# Patient Record
Sex: FEMALE | Race: WHITE | HISPANIC OR LATINO | Employment: OTHER | URBAN - METROPOLITAN AREA
[De-identification: names, ages, dates, MRNs, and addresses within clinical notes are randomized per-mention and may not be internally consistent; named-entity substitution may affect disease eponyms.]

---

## 2017-07-11 ENCOUNTER — HOSPITAL ENCOUNTER (EMERGENCY)
Facility: HOSPITAL | Age: 57
Discharge: HOME | End: 2017-07-11
Admitting: EMERGENCY MEDICINE
Payer: COMMERCIAL

## 2017-07-11 ENCOUNTER — APPOINTMENT (EMERGENCY)
Dept: CT IMAGING | Facility: HOSPITAL | Age: 57
End: 2017-07-11
Payer: COMMERCIAL

## 2017-07-11 VITALS
DIASTOLIC BLOOD PRESSURE: 72 MMHG | SYSTOLIC BLOOD PRESSURE: 158 MMHG | HEART RATE: 95 BPM | TEMPERATURE: 99.9 F | RESPIRATION RATE: 20 BRPM | WEIGHT: 250 LBS | BODY MASS INDEX: 41.65 KG/M2 | HEIGHT: 65 IN | OXYGEN SATURATION: 95 %

## 2017-07-11 DIAGNOSIS — S09.90XA HEAD INJURY, INITIAL ENCOUNTER: Primary | ICD-10-CM

## 2017-07-11 PROCEDURE — 70450 CT HEAD/BRAIN W/O DYE: CPT

## 2017-07-11 PROCEDURE — 99284 EMERGENCY DEPT VISIT MOD MDM: CPT

## 2017-07-11 RX ORDER — BUPROPION HYDROCHLORIDE 150 MG/1
150 TABLET ORAL DAILY
COMMUNITY
End: 2021-07-13

## 2017-07-11 RX ORDER — ASPIRIN 81 MG/1
81 TABLET ORAL 2 TIMES DAILY
COMMUNITY
End: 2021-07-13

## 2017-07-11 RX ORDER — SIMVASTATIN 10 MG
10 TABLET ORAL
COMMUNITY
End: 2021-07-13

## 2020-11-27 ENCOUNTER — APPOINTMENT (OUTPATIENT)
Dept: RADIOLOGY | Facility: MEDICAL CENTER | Age: 60
End: 2020-11-27
Payer: COMMERCIAL

## 2020-11-27 ENCOUNTER — OFFICE VISIT (OUTPATIENT)
Dept: OBGYN CLINIC | Facility: MEDICAL CENTER | Age: 60
End: 2020-11-27
Payer: COMMERCIAL

## 2020-11-27 VITALS
WEIGHT: 273 LBS | HEART RATE: 93 BPM | TEMPERATURE: 97.3 F | DIASTOLIC BLOOD PRESSURE: 83 MMHG | BODY MASS INDEX: 45.48 KG/M2 | SYSTOLIC BLOOD PRESSURE: 130 MMHG | HEIGHT: 65 IN

## 2020-11-27 DIAGNOSIS — M16.11 PRIMARY OSTEOARTHRITIS OF ONE HIP, RIGHT: Primary | ICD-10-CM

## 2020-11-27 DIAGNOSIS — M25.551 PAIN IN RIGHT HIP: ICD-10-CM

## 2020-11-27 PROCEDURE — 73502 X-RAY EXAM HIP UNI 2-3 VIEWS: CPT

## 2020-11-27 PROCEDURE — 99243 OFF/OP CNSLTJ NEW/EST LOW 30: CPT | Performed by: ORTHOPAEDIC SURGERY

## 2020-12-22 ENCOUNTER — CONSULT (OUTPATIENT)
Dept: PAIN MEDICINE | Facility: CLINIC | Age: 60
End: 2020-12-22
Payer: COMMERCIAL

## 2020-12-22 ENCOUNTER — APPOINTMENT (OUTPATIENT)
Dept: RADIOLOGY | Facility: CLINIC | Age: 60
End: 2020-12-22
Payer: COMMERCIAL

## 2020-12-22 VITALS
BODY MASS INDEX: 45.15 KG/M2 | TEMPERATURE: 97.9 F | HEART RATE: 83 BPM | SYSTOLIC BLOOD PRESSURE: 138 MMHG | HEIGHT: 65 IN | WEIGHT: 271 LBS | DIASTOLIC BLOOD PRESSURE: 78 MMHG

## 2020-12-22 DIAGNOSIS — M16.11 PRIMARY OSTEOARTHRITIS OF ONE HIP, RIGHT: ICD-10-CM

## 2020-12-22 DIAGNOSIS — M25.551 PAIN IN RIGHT HIP: ICD-10-CM

## 2020-12-22 DIAGNOSIS — G89.4 CHRONIC PAIN SYNDROME: ICD-10-CM

## 2020-12-22 DIAGNOSIS — M54.16 LUMBAR RADICULOPATHY: ICD-10-CM

## 2020-12-22 DIAGNOSIS — M79.18 MYOFASCIAL PAIN SYNDROME: ICD-10-CM

## 2020-12-22 DIAGNOSIS — M54.16 LUMBAR RADICULOPATHY: Primary | ICD-10-CM

## 2020-12-22 PROBLEM — M43.02 CERVICAL SPONDYLOLYSIS: Status: ACTIVE | Noted: 2020-08-31

## 2020-12-22 PROBLEM — G56.01 CARPAL TUNNEL SYNDROME OF RIGHT WRIST: Status: ACTIVE | Noted: 2020-08-31

## 2020-12-22 PROBLEM — M54.12 CERVICAL RADICULOPATHY: Status: ACTIVE | Noted: 2020-10-27

## 2020-12-22 PROCEDURE — 72110 X-RAY EXAM L-2 SPINE 4/>VWS: CPT

## 2020-12-22 PROCEDURE — 99244 OFF/OP CNSLTJ NEW/EST MOD 40: CPT | Performed by: ANESTHESIOLOGY

## 2020-12-22 RX ORDER — ATORVASTATIN CALCIUM 10 MG/1
10 TABLET, FILM COATED ORAL DAILY
COMMUNITY
End: 2021-07-13

## 2020-12-22 RX ORDER — ALBUTEROL SULFATE 90 UG/1
2 AEROSOL, METERED RESPIRATORY (INHALATION) EVERY 6 HOURS PRN
COMMUNITY

## 2020-12-28 ENCOUNTER — TELEPHONE (OUTPATIENT)
Dept: PAIN MEDICINE | Facility: CLINIC | Age: 60
End: 2020-12-28

## 2021-01-08 ENCOUNTER — HOSPITAL ENCOUNTER (OUTPATIENT)
Dept: RADIOLOGY | Facility: CLINIC | Age: 61
Discharge: HOME | End: 2021-01-08
Attending: ANESTHESIOLOGY
Payer: COMMERCIAL

## 2021-01-08 ENCOUNTER — HOSPITAL ENCOUNTER (OUTPATIENT)
Dept: MRI IMAGING | Facility: HOSPITAL | Age: 61
Discharge: HOME | End: 2021-01-08
Attending: ANESTHESIOLOGY
Payer: COMMERCIAL

## 2021-01-08 VITALS
OXYGEN SATURATION: 91 % | SYSTOLIC BLOOD PRESSURE: 140 MMHG | HEART RATE: 103 BPM | TEMPERATURE: 97.3 F | RESPIRATION RATE: 22 BRPM | DIASTOLIC BLOOD PRESSURE: 84 MMHG

## 2021-01-08 DIAGNOSIS — M54.16 LUMBAR RADICULOPATHY: ICD-10-CM

## 2021-01-08 DIAGNOSIS — M16.11 PRIMARY OSTEOARTHRITIS OF ONE HIP, RIGHT: ICD-10-CM

## 2021-01-08 PROCEDURE — 77002 NEEDLE LOCALIZATION BY XRAY: CPT | Performed by: ANESTHESIOLOGY

## 2021-01-08 PROCEDURE — 77002 NEEDLE LOCALIZATION BY XRAY: CPT

## 2021-01-08 PROCEDURE — 20610 DRAIN/INJ JOINT/BURSA W/O US: CPT | Performed by: ANESTHESIOLOGY

## 2021-01-08 PROCEDURE — 72148 MRI LUMBAR SPINE W/O DYE: CPT

## 2021-01-08 PROCEDURE — G1004 CDSM NDSC: HCPCS

## 2021-01-08 RX ORDER — LIDOCAINE HYDROCHLORIDE 10 MG/ML
5 INJECTION, SOLUTION EPIDURAL; INFILTRATION; INTRACAUDAL; PERINEURAL ONCE
Status: COMPLETED | OUTPATIENT
Start: 2021-01-08 | End: 2021-01-08

## 2021-01-08 RX ORDER — METHYLPREDNISOLONE ACETATE 80 MG/ML
80 INJECTION, SUSPENSION INTRA-ARTICULAR; INTRALESIONAL; INTRAMUSCULAR; PARENTERAL; SOFT TISSUE ONCE
Status: COMPLETED | OUTPATIENT
Start: 2021-01-08 | End: 2021-01-08

## 2021-01-08 RX ADMIN — LIDOCAINE HYDROCHLORIDE 2 ML: 20 INJECTION, SOLUTION EPIDURAL; INFILTRATION; INTRACAUDAL at 11:41

## 2021-01-08 RX ADMIN — LIDOCAINE HYDROCHLORIDE 3 ML: 10 INJECTION, SOLUTION EPIDURAL; INFILTRATION; INTRACAUDAL; PERINEURAL at 11:38

## 2021-01-08 RX ADMIN — METHYLPREDNISOLONE ACETATE 80 MG: 80 INJECTION, SUSPENSION INTRA-ARTICULAR; INTRALESIONAL; INTRAMUSCULAR; SOFT TISSUE at 11:42

## 2021-01-08 RX ADMIN — IOHEXOL 1 ML: 300 INJECTION, SOLUTION INTRAVENOUS at 11:40

## 2021-01-08 NOTE — H&P
History of Present Illness: The patient is a 63 y o  female who presents with complaints of right hip pain      Patient Active Problem List   Diagnosis    Myofascial pain syndrome    Chronic pain syndrome    Primary osteoarthritis of one hip, right    Acute bronchitis    Carpal tunnel syndrome of right wrist    Cervical radiculopathy    Cervical spondylolysis    [facility]A [facility]re[facility] accident) [facility] [address] [facility]    Encounter for medical clearance for patient hold    Earache, left    History of recent stroke    Mild intermittent asthma without complication    Morbid obesity ([facility] [address] )    Obstructive sleep apnea syndrome       Past Medical History:   Diagnosis Date    Anxiety     Arthritis     Asthma     Chest pain     Depression     Fatty liver     GERD (gastroesophageal reflux disease)     High cholesterol     Hyperlipidemia     Kidney stones     Myofascial pain     Psychiatric disorder     Stroke [facility])        Past Surgical History:   Procedure Laterality Date    TOTAL HIP ARTHROPLASTY           Current Outpatient Medications:     albuterol (PROVENTIL HFA,VENTOLIN HFA) 90 mcg/act inhaler, Inhale 2 puffs every 6 (six) hours as needed for wheezing, Disp: , Rfl:     aspirin (ECOTRIN LOW STRENGTH) 81 mg EC tablet, Take 81 mg by mouth 2 (two) times a day, Disp: , Rfl:     atorvastatin (LIPITOR) 10 mg tablet, Take 10 mg by mouth daily, Disp: , Rfl:     buPROPion (WELLBUTRIN XL) 150 mg 24 hr tablet, Take 150 mg by mouth daily, Disp: , Rfl:     Homeopathic Products (ARNICA EX), Apply topically, Disp: , Rfl:     simvastatin (ZOCOR) 10 mg tablet, Take 10 mg by mouth daily at bedtime, Disp: , Rfl:     TURMERIC PO, Take by mouth, Disp: , Rfl:     Current Facility-Administered Medications:     iohexol (OMNIPAQUE) 300 mg/mL injection 50 mL, 50 mL, Intra-articular, Once, Edmundo Alvarez DO    lidocaine (PF) (XYLOCAINE-MPF) 1 % injection 5 mL, 5 mL, Other, Once, Edmundo Alvarez DO    lidocaine (PF) (XYLOCAINE-MPF) 2 % injection 5 mL, 5 mL, Intra-articular, Once, Edmundo Alvarez DO    methylPREDNISolone acetate (DEPO-MEDROL) injection 80 mg, 80 mg, Intra-articular, Once, Edmundo Alvarez DO    Allergies   Allergen Reactions    Fluticasone-Salmeterol      Other reaction(s): Other (See Comments)  Difficulty breathing / mouth sores    Penicillins      Other reaction(s): Nausea and/or vomiting    Salmeterol      Other reaction(s): Other (See Comments)  "Difficulty breathing / sores in mouth"       Physical Exam:   General: Awake, Alert, Oriented x 3, Mood and affect appropriate  Respiratory: Respirations even and unlabored  Cardiovascular: Peripheral pulses intact; no edema  Musculoskeletal Exam: decreased range of motion right    ASA Score: III       Assessment:   1   Primary osteoarthritis of one hip, right        Plan: right hip joint injection

## 2021-01-08 NOTE — DISCHARGE INSTRUCTIONS
Steroid Joint Injection   WHAT YOU NEED TO KNOW:   A steroid joint injection is a procedure to inject steroid medicine into a joint  Steroid medicine decreases pain and inflammation  The injection may also contain an anesthetic (numbing medicine) to decrease pain  It may be done to treat conditions such as arthritis, gout, or carpal tunnel syndrome  The injections may be given in your knee, ankle, shoulder, elbow, wrist, ankle or sacroiliac joint  1  Do not apply heat to any area that is numb  If you have discomfort or soreness at the injection site, you may apply ice today, 20 minutes on and 20 minutes off  Tomorrow you may use ice or warm, moist heat  Do not apply ice or heat directly to the skin  2  You may have an increase or change in the discomfort for 36-48 hours after your treatment  Apply ice and continue with any pain medicine you have been prescribed  3  Do not do anything strenuous today  You may shower, but no tub baths or hot tubs today  You may resume your normal activities tomorrow, but do not overdo it  Resume normal activities slowly when you are feeling better  4  If you experience redness, drainage or swelling at the injection site, or if you develop a fever above 100 degrees, please call [facility] at [phone] or go to the Emergency Room  5  Continue to take all routine medicines prescribed by your primary care physician unless otherwise instructed by our staff  Most blood thinners should be started again according to your regularly scheduled dosing  If you have any questions, please give our office a call  If you have a problem specifically related to your procedure, please call our office at [phone]  Problems not related to your procedure should be directed to your primary care physician

## 2021-01-12 ENCOUNTER — TELEPHONE (OUTPATIENT)
Dept: PAIN MEDICINE | Facility: MEDICAL CENTER | Age: 61
End: 2021-01-12

## 2021-01-12 NOTE — TELEPHONE ENCOUNTER
----- Message from Roseanne Damico DO sent at 1/11/2021 11:34 AM EST -----  MRI lumbar: Subacute L5 compression fracture with moderate loss of height, stable since 12/22/2020      Multilevel degenerative changes of the lumbar spine, as described above  Narrowing of the lateral recesses is noted at L4-L5 with left greater than right contact of the descending L5 nerve roots  Is her pain greater on the right?   Would consider right L5 TFESI X 2

## 2021-01-13 NOTE — TELEPHONE ENCOUNTER
S/w pt, advised of above  Pt stated that her pain is worst on the R  Pt added that she has myofascial pain syndrome and advised that there was difficulty during her facet joint injection in the past s/t the tightness in her muscles  Pt stated that she will need to sit down and think about this info before making a decision  Advised pt to cb with any questions she may have  Pt verbalized understanding and appreciation       SISSY

## 2021-01-15 ENCOUNTER — TELEPHONE (OUTPATIENT)
Dept: PAIN MEDICINE | Facility: CLINIC | Age: 61
End: 2021-01-15

## 2021-03-05 ENCOUNTER — TELEPHONE (OUTPATIENT)
Dept: RADIOLOGY | Facility: CLINIC | Age: 61
End: 2021-03-05

## 2021-03-05 ENCOUNTER — TELEPHONE (OUTPATIENT)
Dept: PAIN MEDICINE | Facility: CLINIC | Age: 61
End: 2021-03-05

## 2021-03-05 NOTE — TELEPHONE ENCOUNTER
PT HAS SOME QUESTIONS ABOUT MRI  AND PAIN IN KNEE AND LOWER BACK AND MID BACK AND WOULD LIKE TO SCHEDULE A PROCEDURE

## 2021-03-10 DIAGNOSIS — Z23 ENCOUNTER FOR IMMUNIZATION: ICD-10-CM

## 2021-03-18 ENCOUNTER — TELEMEDICINE (OUTPATIENT)
Dept: PAIN MEDICINE | Facility: CLINIC | Age: 61
End: 2021-03-18
Payer: COMMERCIAL

## 2021-03-18 ENCOUNTER — TELEPHONE (OUTPATIENT)
Dept: RADIOLOGY | Facility: CLINIC | Age: 61
End: 2021-03-18

## 2021-03-18 DIAGNOSIS — G89.4 CHRONIC PAIN SYNDROME: Primary | ICD-10-CM

## 2021-03-18 DIAGNOSIS — M54.16 LUMBAR RADICULOPATHY: ICD-10-CM

## 2021-03-18 PROCEDURE — 99214 OFFICE O/P EST MOD 30 MIN: CPT | Performed by: NURSE PRACTITIONER

## 2021-03-18 NOTE — PROGRESS NOTES
Virtual Brief Visit    Assessment/Plan:     at this time we will get the patient scheduled for a right L5 transforaminal epidural steroid injection to decrease any inflammatory component of her pain symptoms  This may need to be repeated  Patient tells me that she is on her last day of antibiotics for a urinary tract infection  Follow up after procedure    Problem List Items Addressed This Visit        Nervous and Auditory    Lumbar radiculopathy    Relevant Orders    FL spine and pain procedure       Other    Chronic pain syndrome - Primary                Reason for visit is  Follow-up  Chief Complaint   Patient presents with    Virtual Brief Visit        Encounter provider Jen Elizabeth [address]     Provider located at [address]  [address]  [facility] [address]  [phone]    Recent Visits  No visits were found meeting these conditions  Showing recent visits within past 7 days and meeting all other requirements     Today's Visits  Date Type Provider Dept   03/18/21 [address] [place], [place] [address]   Showing today's visits and meeting all other requirements     Future Appointments  No visits were found meeting these conditions  Showing future appointments within next 150 days and meeting all other requirements        After connecting through telephone, the patient was identified by name and date of birth  Guadalupe Jones was informed that this is a telemedicine visit and that the visit is being conducted through telephone  My office door was closed  No one else was in the room  She acknowledged consent and understanding of privacy and security of the platform  The patient has agreed to participate and understands she can discontinue the visit at any time  Patient is aware this is a billable service       Subjective    Guadalupe Jones is a 63 y o  female  Presents for follow-up related to her right low back and leg pain   Today she rates the pain 8/10 she localizes her pain across her low back and down her right leg  Patient is also having some pain in her left knee but this is being managed by her PCP and will be referred to orthopedics for further evaluation she tells me  Patient is status post a right intra-articular hip injection on January 8, 2021  She tells me that she did get the level of relief she was hoping but it definitely has improved her symptoms  Patient tells me that she did get a phone call to review her lumbar spine MRI and she was offered a right L5 transforaminal epidural steroid injection  She told me that she want to take some time to think about it but she is rated move forward as her pain symptoms are worsening  Suzette ARAGON     Past Medical History:   Diagnosis Date    Anxiety     Arthritis     Asthma     Chest pain     Depression     Fatty liver     GERD (gastroesophageal reflux disease)     High cholesterol     Hyperlipidemia     Kidney stones     Myofascial pain     Psychiatric disorder     Stroke [facility])        Past Surgical History:   Procedure Laterality Date    TOTAL HIP ARTHROPLASTY         Current Outpatient Medications   Medication Sig Dispense Refill    albuterol (PROVENTIL HFA,VENTOLIN HFA) 90 mcg/act inhaler Inhale 2 puffs every 6 (six) hours as needed for wheezing      aspirin (ECOTRIN LOW STRENGTH) 81 mg EC tablet Take 81 mg by mouth 2 (two) times a day      atorvastatin (LIPITOR) 10 mg tablet Take 10 mg by mouth daily      buPROPion (WELLBUTRIN XL) 150 mg 24 hr tablet Take 150 mg by mouth daily      Homeopathic Products (ARNICA EX) Apply topically      simvastatin (ZOCOR) 10 mg tablet Take 10 mg by mouth daily at bedtime      TURMERIC PO Take by mouth       No current facility-administered medications for this visit  Allergies   Allergen Reactions    Fluticasone-Salmeterol      Other reaction(s):  Other (See Comments)  Difficulty breathing / mouth sores  Penicillins      Other reaction(s): Nausea and/or vomiting    Salmeterol      Other reaction(s): Other (See Comments)  "Difficulty breathing / sores in mouth"       Review of Systems    There were no vitals filed for this visit  I spent 8 minutes directly with the patient during this visit  It was my intent to perform this visit via video technology but the patient was not able to do a video connection so the visit was completed via audio telephone only  VIRTUAL VISIT DISCLAIMER    Zaki Huntley acknowledges that she has consented to an online visit or consultation  She understands that the online visit is based solely on information provided by her, and that, in the absence of a face-to-face physical evaluation by the physician, the diagnosis she receives is both limited and provisional in terms of accuracy and completeness  This is not intended to replace a full medical face-to-face evaluation by the physician  Zaki Huntley understands and accepts these terms

## 2021-03-18 NOTE — TELEPHONE ENCOUNTER
Called to schedule pt and she states that she just got the covid vaccine yesterday 3/17/21 per our guide lines we have to wait 2 weeks till a procedure and then she told me that she would be getting her second one on 4/6 I advised that I could not schedule her till 4/22 due to timing with the vaccine and that is when she became very upset  Pt states that she is in a lot of pain and that she needs something and that she can't believe that she has to wait another month to get her back injection  I apologized to her and said that this is the protocol  She states that she was already told about Voltaren gel and that she is already using Biofreeze  Pt would like to know if there is anything else she can do to help with the pain? Pt states that she does not want anything that is addictive that she does not need that she has so many other things going on

## 2021-03-23 NOTE — TELEPHONE ENCOUNTER
Attempted to reach pt  Left detailed message, per LIDIA, advising of AO's notation, [place] and phone # provided for c/b to discuss further

## 2021-03-23 NOTE — TELEPHONE ENCOUNTER
Patient called returning the nurses call  Please be advise thank you        Please call patient back @ [phone] can be reached @  12:30 pm

## 2021-03-25 ENCOUNTER — APPOINTMENT (OUTPATIENT)
Dept: RADIOLOGY | Facility: MEDICAL CENTER | Age: 61
End: 2021-03-25
Payer: COMMERCIAL

## 2021-03-25 ENCOUNTER — OFFICE VISIT (OUTPATIENT)
Dept: OBGYN CLINIC | Facility: MEDICAL CENTER | Age: 61
End: 2021-03-25
Payer: COMMERCIAL

## 2021-03-25 VITALS
BODY MASS INDEX: 46.61 KG/M2 | DIASTOLIC BLOOD PRESSURE: 87 MMHG | WEIGHT: 273 LBS | HEIGHT: 64 IN | HEART RATE: 89 BPM | SYSTOLIC BLOOD PRESSURE: 140 MMHG | TEMPERATURE: 98.4 F

## 2021-03-25 DIAGNOSIS — Z01.89 ENCOUNTER FOR LOWER EXTREMITY COMPARISON IMAGING STUDY: ICD-10-CM

## 2021-03-25 DIAGNOSIS — M17.12 PATELLOFEMORAL ARTHRITIS OF LEFT KNEE: ICD-10-CM

## 2021-03-25 DIAGNOSIS — M25.562 LEFT KNEE PAIN, UNSPECIFIED CHRONICITY: ICD-10-CM

## 2021-03-25 DIAGNOSIS — R63.4 WEIGHT LOSS: ICD-10-CM

## 2021-03-25 DIAGNOSIS — M17.12 PRIMARY OSTEOARTHRITIS OF LEFT KNEE: Primary | ICD-10-CM

## 2021-03-25 PROCEDURE — 73564 X-RAY EXAM KNEE 4 OR MORE: CPT

## 2021-03-25 PROCEDURE — 73560 X-RAY EXAM OF KNEE 1 OR 2: CPT

## 2021-03-25 PROCEDURE — 99214 OFFICE O/P EST MOD 30 MIN: CPT | Performed by: ORTHOPAEDIC SURGERY

## 2021-03-25 NOTE — PROGRESS NOTES
Assessment/Plan     1  Primary osteoarthritis of left knee    2  Left knee pain, unspecified chronicity    3  Encounter for lower extremity comparison imaging study    4  Patellofemoral arthritis of left knee    5  Weight loss      Orders Placed This Encounter   Procedures    XR knee 4+ vw left injury    XR knee 1 or 2 vw right    Ambulatory referral to Nutrition Services    Steroid Injection     · Patient had mild left knee and moderate patellofemoral osteoarthritis, MCL strain and possible meniscal tear  · Discussed with patient conservative treatments: steroid injections, physical therapy , weight loss and medications  · Will hold off on steroid injection due to being scheduled for her 2nd COVID vaccine on 4/6/21  · An order was given out for [facility] for left knee steroid injection  Patient is scheduled for lumbar maria esther and would like to have left knee injection at the same time   · Declined physical therapy script: Provided patient with home stretching and strengthening exercises for the left knee  Advised patient to do the exercises for 4-6 daily and if no improvement, to call and make an appointment   · Continue taking Tylenol a 1000 mg every 8 hours as needed for pain  Do not exceed 3000 mg a day  She cannot take NSAIDs due to history of having a stroke  · Continue wearing knee brace as needed for comfort     Return if symptoms worsen or fail to improve  I answered all of the patient's questions during the visit and provided education of the patient's condition during the visit  The patient verbalized understanding of the information given and agrees with the plan  This note was dictated using [employer] software  It may contain errors including improperly dictated words  Please contact physician directly for any questions  History of Present Illness   Chief complaint:   Chief Complaint   Patient presents with    Left Knee - Pain       HPI: Layla Walden is a 63 y o  female that c/o left knee pain  She was referred by her PCP Dr Kike Valverde  She states she had a partial fall down the steps in Jan 2021 and twisted her left knee  She also notes in Feb 2021 she was shoveling from pivoting and lifting aggravated  her left knee  She is having dull achy pain that comes and goes over anteromedial left knee  She denies any mechanical symptoms  Pain is worse going up and down steps, pivoting, turning, getting in and out of a car, and at night when turning in bed  She has been taking Tylenol Arthritis and Tumeric beans with some relief  She stopping using the  Voltaren gel  due to side effects she read( h of stroke)     She has been wearing an over-the-counter knee brace with some relief  Patient cannot take NSAIDs due to history of having a stroke  She has no history of having any injections, physical therapy or surgeries on the left knee  She did have her first [place] vaccination on March seventeenth and is due for her second injection on 04/06/2021  She atient also has severe right hip osteoarthritis  She did have a right hip steroid injection with Dr Priti Alanis on 01/08/2021and had  10-30% relief from the injection  ROS:    See HPI for musculoskeletal review  All other systems reviewed are negative     Historical Information   Past Medical History:   Diagnosis Date    Anxiety     Arthritis     Asthma     Chest pain     Depression     Fatty liver     GERD (gastroesophageal reflux disease)     High cholesterol     Hyperlipidemia     Kidney stones     Myofascial pain     Psychiatric disorder     Stroke [facility])      Past Surgical History:   Procedure Laterality Date    TOTAL HIP ARTHROPLASTY       Social History   Social History     Substance and Sexual Activity   Alcohol Use No     Social History     Substance and Sexual Activity   Drug Use No     Social History     Tobacco Use   Smoking Status Former Smoker   Smokeless Tobacco Never Used     Family History: No family history on file      Current Outpatient Medications on File Prior to Visit   Medication Sig Dispense Refill    albuterol (PROVENTIL HFA,VENTOLIN HFA) 90 mcg/act inhaler Inhale 2 puffs every 6 (six) hours as needed for wheezing      aspirin (ECOTRIN LOW STRENGTH) 81 mg EC tablet Take 81 mg by mouth 2 (two) times a day      atorvastatin (LIPITOR) 10 mg tablet Take 10 mg by mouth daily      buPROPion (WELLBUTRIN XL) 150 mg 24 hr tablet Take 150 mg by mouth daily      Homeopathic Products (ARNICA EX) Apply topically      simvastatin (ZOCOR) 10 mg tablet Take 10 mg by mouth daily at bedtime      TURMERIC PO Take by mouth       No current facility-administered medications on file prior to visit  Allergies   Allergen Reactions    Fluticasone-Salmeterol      Other reaction(s): Other (See Comments)  Difficulty breathing / mouth sores    Penicillins      Other reaction(s): Nausea and/or vomiting    Salmeterol      Other reaction(s): Other (See Comments)  "Difficulty breathing / sores in mouth"       Current Outpatient Medications on File Prior to Visit   Medication Sig Dispense Refill    albuterol (PROVENTIL HFA,VENTOLIN HFA) 90 mcg/act inhaler Inhale 2 puffs every 6 (six) hours as needed for wheezing      aspirin (ECOTRIN LOW STRENGTH) 81 mg EC tablet Take 81 mg by mouth 2 (two) times a day      atorvastatin (LIPITOR) 10 mg tablet Take 10 mg by mouth daily      buPROPion (WELLBUTRIN XL) 150 mg 24 hr tablet Take 150 mg by mouth daily      Homeopathic Products (ARNICA EX) Apply topically      simvastatin (ZOCOR) 10 mg tablet Take 10 mg by mouth daily at bedtime      TURMERIC PO Take by mouth       No current facility-administered medications on file prior to visit  Objective   Vitals: Blood pressure 140/87, pulse 89, temperature 98 4 °F (36 9 °C), height 5' 4" (1 626 m), weight 124 kg (273 lb)  ,Body mass index is 46 86 kg/m²      PE:  AAOx 3  WDWN  Hearing intact, no drainage from eyes  Regular rate  no audible wheezing  no abdominal distension  LE compartments soft, skin intact    leftknee:    Appearance:  no swelling   No ecchymosis  no obvious joint deformity   No effusion  Palpation/Tenderness:  No TTP over medial joint line  No TTP over lateral joint line   No TTP over patella  No TTP over patellar tendon  Mild  TTP over pes anserine bursa  Active Range of Motion:  AROM: 0-120   PROM 0-125   Special Tests:  Medial Cheryl's Test:  Positive  Lateral Cheryl's Test:  Negative  Apley's compression test:  Negative  Lachman's Test:  negative  Anterior and Posterior  Drawer Test:  Negative  Patellar grind:  +   Valgus Stress Test:  negative  Varus Stress Test:  negative   No ipsilateral hip pain with ROM      Imaging Studies: I have personally reviewed pertinent films in PACS  leftknee:  Mild DJD and moderate patellofemoral DJD         Scribe Attestation    I,:  Thomas Sharp am acting as a scribe while in the presence of the attending physician :       I,:  Evelyn Arnold DO personally performed the services described in this documentation    as scribed in my presence :

## 2021-03-25 NOTE — TELEPHONE ENCOUNTER
S/w pt, advised of above  Per pt, she has tried gabapentin in the past with min - no relief  Pt stated that she did some research and would like to try cymbalta as that seemed to address arthritis pain and specifically back and knee pain moreso than the lyrica  Pt stated that she is not sure about the lyrica and would have to give that more consideration  Advised pt, will d/w AO and cb to advise  Pt verbalized understanding and appreciation

## 2021-03-29 NOTE — TELEPHONE ENCOUNTER
She can not have duloxetine because it will interact with her wellbutrin that is why I didn't suggest that medication as an option

## 2021-03-30 NOTE — TELEPHONE ENCOUNTER
S/w pt, advised of above  Per pt, she has not taken welbutrin in over a year and has no plan to resume it  Advised pt, will d/w AO and cb to advise  Pt c/o significant pain, stated that she has been forced to agree to early USP s/t sciatic pain  Advised pt, will jacob AO and cb to advise  Pt verbalized understanding and appreciation

## 2021-03-31 ENCOUNTER — TELEPHONE (OUTPATIENT)
Dept: PAIN MEDICINE | Facility: MEDICAL CENTER | Age: 61
End: 2021-03-31

## 2021-03-31 DIAGNOSIS — M54.12 CERVICAL RADICULOPATHY: ICD-10-CM

## 2021-03-31 DIAGNOSIS — M54.16 LUMBAR RADICULOPATHY: Primary | ICD-10-CM

## 2021-03-31 RX ORDER — DULOXETIN HYDROCHLORIDE 30 MG/1
30 CAPSULE, DELAYED RELEASE ORAL DAILY
Qty: 30 CAPSULE | Refills: 0 | Status: SHIPPED | OUTPATIENT
Start: 2021-03-31 | End: 2021-07-06

## 2021-03-31 RX ORDER — DULOXETIN HYDROCHLORIDE 30 MG/1
30 CAPSULE, DELAYED RELEASE ORAL DAILY
Qty: 30 CAPSULE | Refills: 0 | Status: SHIPPED | OUTPATIENT
Start: 2021-03-31 | End: 2021-03-31 | Stop reason: SDUPTHER

## 2021-03-31 NOTE — TELEPHONE ENCOUNTER
Clinical -Pt called in stating that [facility] sent her DULoxetine (CYMBALTA) 30 mg delayed release capsule to the wrong pharmacy   Pt is without meds and leaving town today      [address] [place] [address] Ne- pls update the chart to show [facility] as the default pharmacy - pls remove [facility] from chart    [facility] [address]     Thank you      [phone]

## 2021-03-31 NOTE — TELEPHONE ENCOUNTER
Left a detailed message on machine per medical communication consent on file advising of cymbalta rx, 1 pill daily  Do not drive or operate machinery until you are familiar with how this medication may affect you  Cb if se's or questions arise  Do not stop this medication abruptly - please cb for refills  This office is aware - the rx should be sent to [address]  Anticipate this will be corrected and ready for pu this afternoon  Provided cb number and office hours for questions or concerns

## 2021-03-31 NOTE — TELEPHONE ENCOUNTER
[id number], if she is not taking the wellbutrin I will send duloxetine 30mg 1 tablet daily

## 2021-04-20 ENCOUNTER — TELEPHONE (OUTPATIENT)
Dept: RADIOLOGY | Facility: CLINIC | Age: 61
End: 2021-04-20

## 2021-04-20 NOTE — TELEPHONE ENCOUNTER
R L5 TFESI #1 - ok to use cbd oil, correct? Would advise - Do not apply it topically to the lumbar spine area on the day of the procedure

## 2021-04-20 NOTE — TELEPHONE ENCOUNTER
Left a detailed message on machine per medical communication consent on file advising of above  Provided cb number and office hours for questions / concerns

## 2021-04-20 NOTE — TELEPHONE ENCOUNTER
Spoke with pt who would like to know if she should stop taking/using her CBD oil for her up coming procedure

## 2021-04-22 ENCOUNTER — HOSPITAL ENCOUNTER (OUTPATIENT)
Dept: RADIOLOGY | Facility: CLINIC | Age: 61
Discharge: HOME | End: 2021-04-22
Attending: ANESTHESIOLOGY | Admitting: ANESTHESIOLOGY
Payer: COMMERCIAL

## 2021-04-22 VITALS
HEART RATE: 87 BPM | DIASTOLIC BLOOD PRESSURE: 78 MMHG | SYSTOLIC BLOOD PRESSURE: 134 MMHG | TEMPERATURE: 97 F | RESPIRATION RATE: 20 BRPM | OXYGEN SATURATION: 92 %

## 2021-04-22 DIAGNOSIS — M54.16 LUMBAR RADICULOPATHY: ICD-10-CM

## 2021-04-22 PROCEDURE — 64483 NJX AA&/STRD TFRM EPI L/S 1: CPT | Performed by: ANESTHESIOLOGY

## 2021-04-22 RX ORDER — METHYLPREDNISOLONE ACETATE 80 MG/ML
80 INJECTION, SUSPENSION INTRA-ARTICULAR; INTRALESIONAL; INTRAMUSCULAR; PARENTERAL; SOFT TISSUE ONCE
Status: COMPLETED | OUTPATIENT
Start: 2021-04-22 | End: 2021-04-22

## 2021-04-22 RX ADMIN — METHYLPREDNISOLONE ACETATE 80 MG: 80 INJECTION, SUSPENSION INTRA-ARTICULAR; INTRALESIONAL; INTRAMUSCULAR; SOFT TISSUE at 11:00

## 2021-04-22 RX ADMIN — IOHEXOL 1 ML: 300 INJECTION, SOLUTION INTRAVENOUS at 11:00

## 2021-04-22 RX ADMIN — LIDOCAINE HYDROCHLORIDE 2 ML: 20 INJECTION, SOLUTION EPIDURAL; INFILTRATION; INTRACAUDAL at 11:00

## 2021-04-22 NOTE — DISCHARGE INSTRUCTIONS
Epidural Steroid Injection   WHAT YOU NEED TO KNOW:   An epidural steroid injection (ASAD) is a procedure to inject steroid medicine into the epidural space  The epidural space is between your spinal cord and vertebrae  Steroids reduce inflammation and fluid buildup in your spine that may be causing pain  You may be given pain medicine along with the steroids  ACTIVITY  · Do not drive or operate machinery today  · No strenuous activity today - bending, lifting, etc   · You may resume normal activites starting tomorrow - start slowly and as tolerated  · You may shower today, but no tub baths or hot tubs  · You may have numbness for several hours from the local anesthetic  Please use caution and common sense, especially with weight-bearing activities  CARE OF THE INJECTION SITE  · If you have soreness or pain, apply ice to the area today (20 minutes on/20 minutes off)  · Starting tomorrow, you may use warm, moist heat or ice if needed  · You may have an increase or change in your discomfort for 36-48 hours after your treatment  · Apply ice and continue with any pain medication you have been prescribed  · [facility]ify [facility] if you have any of the following: redness, drainage, swelling, headache, stiff neck or fever above 100°F     SPECIAL INSTRUCTIONS  · Our office will contact you in approximately 7 days for a progress report  MEDICATIONS  · Continue to take all routine medications  · Our office may have instructed you to hold some medications  As no general anesthesia was used in today's procedure, you should not experience any side effects related to anesthesia  If you have a problem specifically related to your procedure, please call our office at [phone]  Problems not related to your procedure should be directed to your primary care physician

## 2021-04-22 NOTE — H&P
History of Present Illness: The patient is a 63 y o  female who presents with complaints of  Low back and leg pain      Patient Active Problem List   Diagnosis    Myofascial pain syndrome    Chronic pain syndrome    Primary osteoarthritis of one hip, right    Acute bronchitis    Carpal tunnel syndrome of right wrist    Cervical radiculopathy    Cervical spondylolysis    [facility]A [facility]cere[facility] vascular accident) ([facility] [address] [facility]    Encounter for medical clearance for patient hold    Earache, left    History of recent stroke    Mild intermittent asthma without complication    Morbid obesity ([facility] [address] )    Obstructive sleep apnea syndrome    Lumbar radiculopathy    Lumbar foraminal stenosis       Past Medical History:   Diagnosis Date    Anxiety     Arthritis     Asthma     Chest pain     Depression     Fatty liver     GERD (gastroesophageal reflux disease)     High cholesterol     Hyperlipidemia     Kidney stones     Myofascial pain     Psychiatric disorder     Stroke [facility])        Past Surgical History:   Procedure Laterality Date    TOTAL HIP ARTHROPLASTY           Current Outpatient Medications:     albuterol (PROVENTIL HFA,VENTOLIN HFA) 90 mcg/act inhaler, Inhale 2 puffs every 6 (six) hours as needed for wheezing, Disp: , Rfl:     aspirin (ECOTRIN LOW STRENGTH) 81 mg EC tablet, Take 81 mg by mouth 2 (two) times a day, Disp: , Rfl:     atorvastatin (LIPITOR) 10 mg tablet, Take 10 mg by mouth daily, Disp: , Rfl:     buPROPion (WELLBUTRIN XL) 150 mg 24 hr tablet, Take 150 mg by mouth daily, Disp: , Rfl:     DULoxetine (CYMBALTA) 30 mg delayed release capsule, Take 1 capsule (30 mg total) by mouth daily, Disp: 30 capsule, Rfl: 0    Homeopathic Products (ARNICA EX), Apply topically, Disp: , Rfl:     simvastatin (ZOCOR) 10 mg tablet, Take 10 mg by mouth daily at bedtime, Disp: , Rfl:     TURMERIC PO, Take by mouth, Disp: , Rfl:     Current Facility-Administered Medications:     iohexol (OMNIPAQUE) 300 mg/mL injection 50 mL, 50 mL, Epidural, Once, Edmundo Alvarez DO    lidocaine (PF) (XYLOCAINE-MPF) 2 % injection 5 mL, 5 mL, Epidural, Once, Edmundo Alvarez DO    methylPREDNISolone acetate (DEPO-MEDROL) injection 80 mg, 80 mg, Epidural, Once, Edmundo Alvarez DO    Allergies   Allergen Reactions    Fluticasone-Salmeterol      Other reaction(s): Other (See Comments)  Difficulty breathing / mouth sores    Penicillins      Other reaction(s): Nausea and/or vomiting    Salmeterol      Other reaction(s): Other (See Comments)  "Difficulty breathing / sores in mouth"       Physical Exam:   General: Awake, Alert, Oriented x 3, Mood and affect appropriate  Respiratory: Respirations even and unlabored  Cardiovascular: Peripheral pulses intact; no edema  Musculoskeletal Exam:   Decreased range of motion lumbar spine    ASA Score: III         Assessment:   1   Lumbar radiculopathy        Plan: right L5 TFESI #1

## 2021-04-29 ENCOUNTER — TELEPHONE (OUTPATIENT)
Dept: PAIN MEDICINE | Facility: CLINIC | Age: 61
End: 2021-04-29

## 2021-06-25 ENCOUNTER — TELEPHONE (OUTPATIENT)
Dept: PAIN MEDICINE | Facility: CLINIC | Age: 61
End: 2021-06-25

## 2021-06-25 ENCOUNTER — HOSPITAL ENCOUNTER (EMERGENCY)
Facility: HOSPITAL | Age: 61
Discharge: HOME | End: 2021-06-25
Attending: EMERGENCY MEDICINE | Admitting: EMERGENCY MEDICINE
Payer: COMMERCIAL

## 2021-06-25 VITALS
HEART RATE: 103 BPM | WEIGHT: 273 LBS | BODY MASS INDEX: 46.61 KG/M2 | SYSTOLIC BLOOD PRESSURE: 168 MMHG | RESPIRATION RATE: 18 BRPM | HEIGHT: 64 IN | DIASTOLIC BLOOD PRESSURE: 85 MMHG | TEMPERATURE: 97.4 F | OXYGEN SATURATION: 95 %

## 2021-06-25 DIAGNOSIS — G89.29 CHRONIC MYOFASCIAL PAIN: Primary | ICD-10-CM

## 2021-06-25 DIAGNOSIS — M79.18 CHRONIC MYOFASCIAL PAIN: Primary | ICD-10-CM

## 2021-06-25 DIAGNOSIS — M48.061 LUMBAR FORAMINAL STENOSIS: ICD-10-CM

## 2021-06-25 DIAGNOSIS — M79.18 MYOFASCIAL PAIN SYNDROME: ICD-10-CM

## 2021-06-25 DIAGNOSIS — G89.4 CHRONIC PAIN SYNDROME: ICD-10-CM

## 2021-06-25 DIAGNOSIS — M54.16 LUMBAR RADICULOPATHY: Primary | ICD-10-CM

## 2021-06-25 PROCEDURE — 99283 EMERGENCY DEPT VISIT LOW MDM: CPT

## 2021-06-25 PROCEDURE — 99284 EMERGENCY DEPT VISIT MOD MDM: CPT | Performed by: EMERGENCY MEDICINE

## 2021-06-25 PROCEDURE — 96372 THER/PROPH/DIAG INJ SC/IM: CPT

## 2021-06-25 RX ORDER — KETOROLAC TROMETHAMINE 30 MG/ML
15 INJECTION, SOLUTION INTRAMUSCULAR; INTRAVENOUS ONCE
Status: COMPLETED | OUTPATIENT
Start: 2021-06-25 | End: 2021-06-25

## 2021-06-25 RX ADMIN — KETOROLAC TROMETHAMINE 15 MG: 30 INJECTION, SOLUTION INTRAMUSCULAR; INTRAVENOUS at 13:20

## 2021-06-25 NOTE — TELEPHONE ENCOUNTER
Patient   ISADORA Colvin    Please reach back out to patient, she is in a lot of pain level is a 8-10/10  She the over the counter meds are not working and the injections did not help at all  She is getting worse  [address] [facility] , [place] - [address]

## 2021-06-25 NOTE — TELEPHONE ENCOUNTER
Attempted to call the patient and LMOM to CB to F/U  Last Right L5 TFESI on 4/22/21  Next ovs is on 8/4 c/ DG

## 2021-06-25 NOTE — ED NOTES
Pt very tearful about home situation  States she's in so much pain she can't get up and is afraid of falling  Pt advised to call outpatient  through her PCP in order to help make appointments and get additional help in the home        Scooby Penaloza RN  06/25/21 3206

## 2021-06-26 NOTE — ED PROVIDER NOTES
History  Chief Complaint   Patient presents with    Pain     pt repots generalized pain mostly in her legs and arms f"for a while" but it is getting worse the past few days  states she feels like every fiber in her body hurts      74-year-old female presents for evaluation of chronic pain in all 4 extremities  Patient has a longstanding history of mild fascial chronic pain syndrome  Took 3 Tylenol CT yesterday with only mild relief  Patient follows with pain management and has received multiple injections in the past but has been unable to make an appointment within recently  Patient denies any new qualities of her symptoms  Pain is most prominent diffusely in both arms and legs without any specific distribution  Pain is worse with palpation and movement  Alleviated with rest   Patient presents today because she states she has been unable to get appointment with her specialists  Prior to Admission Medications   Prescriptions Last Dose Informant Patient Reported? Taking?    DULoxetine (CYMBALTA) 30 mg delayed release capsule   No No   Sig: Take 1 capsule (30 mg total) by mouth daily   Homeopathic Products (ARNICA EX)   Yes No   Sig: Apply topically   TURMERIC PO   Yes No   Sig: Take by mouth   albuterol (PROVENTIL HFA,VENTOLIN HFA) 90 mcg/act inhaler   Yes No   Sig: Inhale 2 puffs every 6 (six) hours as needed for wheezing   aspirin (ECOTRIN LOW STRENGTH) 81 mg EC tablet   Yes No   Sig: Take 81 mg by mouth 2 (two) times a day   atorvastatin (LIPITOR) 10 mg tablet   Yes No   Sig: Take 10 mg by mouth daily   buPROPion (WELLBUTRIN XL) 150 mg 24 hr tablet   Yes No   Sig: Take 150 mg by mouth daily   simvastatin (ZOCOR) 10 mg tablet   Yes No   Sig: Take 10 mg by mouth daily at bedtime      Facility-Administered Medications: None       Past Medical History:   Diagnosis Date    Anxiety     Arthritis     Asthma     Chest pain     Depression     Fatty liver     GERD (gastroesophageal reflux disease)  High cholesterol     Hyperlipidemia     Kidney stones     Myofascial pain     Psychiatric disorder     Stroke [facility])        Past Surgical History:   Procedure Laterality Date    TOTAL HIP ARTHROPLASTY         History reviewed  No pertinent family history  I have reviewed and agree with the history as documented  E-Cigarette/Vaping     E-Cigarette/Vaping Substances     Social History     Tobacco Use    Smoking status: Former Smoker    Smokeless tobacco: Never Used   Substance Use Topics    Alcohol use: No    Drug use: No       Review of Systems   Constitutional: Negative for fever  Musculoskeletal: Positive for arthralgias  Negative for joint swelling, neck pain and neck stiffness  All other systems reviewed and are negative  Physical Exam  Physical Exam  Vitals and nursing note reviewed  Constitutional:       Appearance: She is well-developed  HENT:      Head: Normocephalic and atraumatic  Right Ear: External ear normal       Left Ear: External ear normal       Nose: Nose normal    Eyes:      General: No scleral icterus  Cardiovascular:      Rate and Rhythm: Normal rate  Pulmonary:      Effort: Pulmonary effort is normal  No respiratory distress  Abdominal:      General: There is no distension  Musculoskeletal:         General: Tenderness present  No deformity  Normal range of motion  Cervical back: Normal range of motion  Comments: Subjective tenderness with hypersensitivity  However, when distracted, no tenderness of medial portions of bilateral upper extremities or medial portions of bilateral lower extremities  Full range of motion of all 4 extremities  Ambulating without difficulty  Skin:     Findings: No rash  Neurological:      General: No focal deficit present  Mental Status: She is alert and oriented to person, place, and time     Psychiatric:         Mood and Affect: Mood normal          Vital Signs  ED Triage Vitals   Temperature Pulse Respirations Blood Pressure SpO2   06/25/21 1247 06/25/21 1247 06/25/21 1247 06/25/21 1248 06/25/21 1248   (!) 97 4 °F (36 3 °C) 103 18 168/85 95 %      Temp Source Heart Rate Source Patient Position - Orthostatic VS BP Location FiO2 (%)   06/25/21 1247 06/25/21 1247 06/25/21 1248 06/25/21 1248 --   Temporal Monitor Lying Left arm       Pain Score       06/25/21 1320       8           Vitals:    06/25/21 1247 06/25/21 1248   BP:  168/85   Pulse: 103    Patient Position - Orthostatic VS:  Lying         Visual Acuity      ED Medications  Medications   ketorolac (TORADOL) injection 15 mg (15 mg Intramuscular Given 6/25/21 1320)       Diagnostic Studies  Results Reviewed     None                 No orders to display              Procedures  Procedures         ED Course                                           MDM  Number of Diagnoses or Management Options  Chronic myofascial pain: new and does not require workup  Diagnosis management comments: 69-year-old female presenting with acute exacerbation of chronic myofascial pain  No symptoms or physical exam findings to suggest DVT  Administer pain control  Follow up with primary care provider and specialist as needed  Amount and/or Complexity of Data Reviewed  Tests in the medicine section of CPT®: ordered and reviewed  Review and summarize past medical records: yes        Disposition  Final diagnoses:   Chronic myofascial pain     Time reflects when diagnosis was documented in both MDM as applicable and the Disposition within this note     Time User Action Codes Description Comment    6/25/2021  1:08 PM Duglas Weems [M94 73,  G89 29] Chronic myofascial pain       ED Disposition     ED Disposition Condition Date/Time Comment    Discharge Stable Fri Jun 25, 2021  1:08 PM Dave Morales discharge to home/self care              Follow-up Information     Follow up With Specialties Details Why Contact Info Additional Information    Rea melendez, [facility]  End [facility]  [address] [facility] Emergency Department Emergency Medicine  If symptoms worsen [address] [place],[address] [address]  [address] [facility] Emergency Department, [address] [place], [place]aisy khan [address]          Discharge Medication List as of 6/25/2021  1:09 PM      CONTINUE these medications which have NOT CHANGED    Details   albuterol (PROVENTIL HFA,VENTOLIN HFA) 90 mcg/act inhaler Inhale 2 puffs every 6 (six) hours as needed for wheezing, Historical Med      aspirin (ECOTRIN LOW STRENGTH) 81 mg EC tablet Take 81 mg by mouth 2 (two) times a day, Historical Med      atorvastatin (LIPITOR) 10 mg tablet Take 10 mg by mouth daily, Historical Med      buPROPion (WELLBUTRIN XL) 150 mg 24 hr tablet Take 150 mg by mouth daily, Historical Med      DULoxetine (CYMBALTA) 30 mg delayed release capsule Take 1 capsule (30 mg total) by mouth daily, Starting Wed 3/31/2021, Normal      Homeopathic Products (ARNICA EX) Apply topically, Historical Med      simvastatin (ZOCOR) 10 mg tablet Take 10 mg by mouth daily at bedtime, Historical Med      TURMERIC PO Take by mouth, Historical Med           No discharge procedures on file      PDMP Review     None          ED Provider  Electronically Signed by           Jd Samuel DO  06/26/21 1532

## 2021-06-28 NOTE — TELEPHONE ENCOUNTER
Pt called in stating increased pain     10/10 and patient is having more difficulty walking, going up or down stairs, getting up from the sitting position  Pt would like to know the next in care regarding procedures   Thank you      jaswant [phone]

## 2021-06-28 NOTE — TELEPHONE ENCOUNTER
SL pt      RN s/w pt  Pt states that she has pain #10/10 with walking and moving from sitting to standing position  Pain is in her lower back but mostly is in her right hip and knee and described as a deep ache and burning  Pt offered steroid taper by DG and pt agreed as long as it is not a long term medication  Script called in to [facility]  Pt will discuss further at [facility] 7/13 with DG scheduled by pt earlier today  Pt advised to CB next week with update on status  Pt verbalized understanding and appreciation

## 2021-07-06 RX ORDER — PREGABALIN 75 MG/1
CAPSULE ORAL
Qty: 90 CAPSULE | Refills: 1 | Status: SHIPPED | OUTPATIENT
Start: 2021-07-06 | End: 2021-07-06 | Stop reason: SDUPTHER

## 2021-07-06 RX ORDER — PREGABALIN 75 MG/1
CAPSULE ORAL
Qty: 90 CAPSULE | Refills: 1 | Status: SHIPPED | OUTPATIENT
Start: 2021-07-06 | End: 2021-07-13

## 2021-07-06 NOTE — TELEPHONE ENCOUNTER
S/w pt, advised of above  Explained lyrica as a neuropathic medication that works with the chemicals in your nervous system that transmit impulses  This medication would be started at a low dose and incresed gradually to a therapeutic range  Se's include but are not limited to dizziness, drowsiness, lightheadedness, strange thoughts / dreams, gi upset, swelling of hands / feet, failure to relieve pain  Advised pt, it is not predictable if the pt will have adequate relief or at what point or if she will experience se's  Pt stated that she has to try something and requested that the rx be sent to [address] [place] [address] in [facility]  Advised pt, anticipate this rx will be sent to the pharmacy for pu today  The writer will cb to provide instructions asap  Pt verbalized understanding and appreciaton

## 2021-07-06 NOTE — TELEPHONE ENCOUNTER
Can you please call the patient and let her know that I sent a script for Lyrica 75 mg, 1 PO HS x 4 days, then 1 PO BID x 4 days, then 1 PO TID  Lets push back her f/u OV to 6 weeks from now for 15 mins with me to give the medication time and see if she notices improvement as she is currently scheduled for 7/13/21 and that would be too soon to know the overall effect  She is not to drive or operate machinery until she sees how the medication affects her and is to call our office if she has any side effects or issues  Thank you

## 2021-07-06 NOTE — TELEPHONE ENCOUNTER
Pt called stating that she would like another prednisone pack    Pt stated that the other one worked but the pain returned     Pt can be reached at [phone]

## 2021-07-06 NOTE — TELEPHONE ENCOUNTER
S/w pt, advised of above  Pt expressed frustration, stating that she does not want to push her appt out because she feels that this whole process is taking too long and she was hoping to schedule a nerve block like she had in the past which provided relief  Advised pt, ok to fu on 7/13 as scheduled  Please cb if questions / concerns arise   Pt verbalized understanding

## 2021-07-06 NOTE — TELEPHONE ENCOUNTER
It is too soon for another steroid taper especially with having injections  The best we can do is try a neuropathic medication such as Lyrica? Does she want to try that?

## 2021-07-13 ENCOUNTER — OFFICE VISIT (OUTPATIENT)
Dept: PAIN MEDICINE | Facility: CLINIC | Age: 61
End: 2021-07-13
Payer: COMMERCIAL

## 2021-07-13 VITALS
DIASTOLIC BLOOD PRESSURE: 78 MMHG | WEIGHT: 273 LBS | HEIGHT: 64 IN | SYSTOLIC BLOOD PRESSURE: 142 MMHG | TEMPERATURE: 98.1 F | BODY MASS INDEX: 46.61 KG/M2 | HEART RATE: 100 BPM

## 2021-07-13 DIAGNOSIS — R52 DIFFUSE PAIN: ICD-10-CM

## 2021-07-13 DIAGNOSIS — G89.4 CHRONIC PAIN SYNDROME: Primary | ICD-10-CM

## 2021-07-13 DIAGNOSIS — M79.18 MYOFASCIAL PAIN SYNDROME: ICD-10-CM

## 2021-07-13 DIAGNOSIS — M54.16 LUMBAR RADICULOPATHY: ICD-10-CM

## 2021-07-13 DIAGNOSIS — M54.50 CHRONIC BILATERAL LOW BACK PAIN WITHOUT SCIATICA: ICD-10-CM

## 2021-07-13 DIAGNOSIS — M48.061 LUMBAR FORAMINAL STENOSIS: ICD-10-CM

## 2021-07-13 DIAGNOSIS — G89.29 CHRONIC BILATERAL LOW BACK PAIN WITHOUT SCIATICA: ICD-10-CM

## 2021-07-13 DIAGNOSIS — E66.9 OBESITY, UNSPECIFIED CLASSIFICATION, UNSPECIFIED OBESITY TYPE, UNSPECIFIED WHETHER SERIOUS COMORBIDITY PRESENT: ICD-10-CM

## 2021-07-13 PROCEDURE — 99214 OFFICE O/P EST MOD 30 MIN: CPT | Performed by: NURSE PRACTITIONER

## 2021-07-13 NOTE — PROGRESS NOTES
Assessment:  1  Chronic pain syndrome    2  Chronic bilateral low back pain without sciatica    3  Lumbar radiculopathy    4  Diffuse pain    5  Lumbar foraminal stenosis    6  Myofascial pain syndrome    7  Obesity, unspecified classification, unspecified obesity type, unspecified whether serious comorbidity present        Plan:    While the patient was in the office today, I did have a thorough conversation with the patient regarding their chronic pain syndrome, symptoms, medication regimen, and treatment plan  I discussed with the patient that at this point time 1 option would be to consider a repeat right L5 and S1 transforaminal epidural steroid injection with Dr Bobbi Llamas to see if that would provide better relief of the right-sided low back and leg pain  However, I did explain to the patient that she is correct that this would not fix her issue and after a thorough conversation, we decided to hold off any repeat injections for now because she is also aware that too much steroids is also not good for her  We had a thorough conversation about trying to take 1 thing at a time and work on 1st trying to better control her overall pain with the medication regimen she is comfortable with  I encouraged the patient continue with the CBD oil since it does seem to be helping, however, I was very honest and explained to her that I am not well-versed in CBD oil or medical marijuana  I also gave her information on how to start educating herself on the medical marijuana qualification process if she would decide to proceed with that option in the future as well since she seems more comfortable with this as a medication regimen verses prescription medic occasions such as Lyrica and she has previously tried and failed gabapentin more than once  The patient also reported that she had done some research on Cymbalta as well and again was not impressed or comfortable because of the list of side effects        We also discussed that since eventually she is going to need a hip replacement surgery but will have to lose at least 40-50 lb 1st, I explained to the patient that I understand her hesitancy with regards to surgery but that we could put in a consultation request to the weight management program as there are both surgical and nonsurgical options and maybe they can help her with her weight loss goal with nutritionist or medications that might help her lose weight and return goal so she can proceed with her hip replacement surgery and start to slowly get better 1 option to call at a time  The patient was very thankful for the time I took to explain at everything and was agreeable to the weight management consultation  The patient will follow-up in 10 weeks for reevaluation  The patient was advised to contact the office should their symptoms worsen in the interim  The patient was agreeable and verbalized an understanding  I attest that I have spent at least 30 minutes face to face with the patient and that at least 50% of the time was educating and/or discussing the patient's symptoms and treatment plan options until the patient was satisfied with the plan  History of Present Illness: The patient is a 57 y o  female last seen on 4/22/2021 who presents for a follow up office visit in regards to Chronic pain syndrome secondary to lumbar stenosis with radiculopathy and  Diffuse myofascial pain  The patient currently reports that since her last office visit and phone conversation she has continue with the chronic right-sided greater than left low back and right lower extremity radicular symptoms into her leg and hip as well as chronic bilateral shoulder, knee and diffuse myofascial pain    The patient is status post a right L5 transforaminal epidural steroid injection on April 22, 2021 with Dr Corbin Lindsey and reports that it is difficult to say as to whether not the injection was helpful or still helping because there is some improvement but again she continues with the right-sided low back and leg pain  The patient reports that she was never able to  the previously discussed Lyrica because our office never sent the prescription according to her pharmacy and then the patient reported that she did some research on her own and feels that Lyrica is not a good medication choice for her as she feels it is addictive and after reading all the possible side effects it is not a medication she would like to proceed with at this time  The patient reports that most recently she has tried and started with CBD oil and tried a higher dose last night and reports that for the 1st time in years she had a good 6-8 hours of sleep and at this point because she has to be careful with medications as she is very sensitive to medications and she is fearful of side effects because she is still taking care of a daughter who has mental disabilities  She did ask if we could consider helping her to see if she could try medical marijuana in the future depending on how she does with the CBD as she feels it is more natural and is more comfortable with CBD or Medical Marijuana as a medication/treatment option  The patient also is quite frustrated as currently the possibility of a right hip replacement is on hold because the surgeons will not consider proceeding with the surgery until she loses weight because of the possibility of the increased risk for infection and/or hardware failure  The patient reports that she has been advised by the orthopedic surgeon to seriously consider bariatric surgery but does understand if she is at a risk for infection for the hip replacement why she would be a risk for infection for bariatric surgery  She presents today for re-evaluation and to discuss her treatment plan options      I have personally reviewed and/or updated the patient's past medical history, past surgical history, family history, social history, current medications, allergies, and vital signs today  Review of Systems:    Review of Systems   Respiratory: Negative for shortness of breath  Cardiovascular: Negative for chest pain  Gastrointestinal: Positive for nausea  Negative for constipation, diarrhea and vomiting  Musculoskeletal: Positive for gait problem  Negative for arthralgias, joint swelling (joint stiffness) and myalgias  Skin: Negative for rash  Neurological: Positive for weakness  Negative for dizziness and seizures  All other systems reviewed and are negative  Past Medical History:   Diagnosis Date    Anxiety     Arthritis     Asthma     Chest pain     Depression     Fatty liver     GERD (gastroesophageal reflux disease)     High cholesterol     Hyperlipidemia     Kidney stones     Myofascial pain     Psychiatric disorder     Stroke [facility])        Past Surgical History:   Procedure Laterality Date    TOTAL HIP ARTHROPLASTY         History reviewed  No pertinent family history  Social History     Occupational History    Not on file   Tobacco Use    Smoking status: Former Smoker    Smokeless tobacco: Never Used   Substance and Sexual Activity    Alcohol use: No    Drug use: No    Sexual activity: Not on file         Current Outpatient Medications:     albuterol (PROVENTIL HFA,VENTOLIN HFA) 90 mcg/act inhaler, Inhale 2 puffs every 6 (six) hours as needed for wheezing, Disp: , Rfl:     COLLAGEN PO, Take by mouth, Disp: , Rfl:     Homeopathic Products (ALLERGY MEDICINE PO), Take by mouth, Disp: , Rfl:     patient supplied medication, CBD OIL, Disp: , Rfl:     TURMERIC PO, Take by mouth, Disp: , Rfl:     Allergies   Allergen Reactions    Fluticasone-Salmeterol      Other reaction(s): Other (See Comments)  Difficulty breathing / mouth sores    Penicillins      Other reaction(s): Nausea and/or vomiting    Salmeterol      Other reaction(s):  Other (See Comments)  "Difficulty breathing / sores in mouth" Physical Exam:    /78 (BP Location: Left arm, Patient Position: Sitting, Cuff Size: Standard)   Pulse 100   Temp 98 1 °F (36 7 °C)   Ht 5' 4" (1 626 m)   Wt 124 kg (273 lb)   BMI 46 86 kg/m²     Constitutional:normal, well developed, well nourished, alert, in no distress and non-toxic and no overt pain behavior  and obese  Eyes:anicteric  HEENT:grossly intact  Neck:supple, symmetric, trachea midline and no masses   Pulmonary:even and unlabored  Cardiovascular:No edema or pitting edema present  Skin:Normal without rashes or lesions and well hydrated  Psychiatric:Mood and affect appropriate  Neurologic:Cranial Nerves II-XII grossly intact  Musculoskeletal:The patient's gait is antalgic, painful, but steady with the use of rolling walker        Imaging  No orders to display         Orders Placed This Encounter   Procedures    Ambulatory referral to Weight Management

## 2021-07-14 PROBLEM — G89.29 CHRONIC BILATERAL LOW BACK PAIN WITHOUT SCIATICA: Status: ACTIVE | Noted: 2021-07-14

## 2021-07-14 PROBLEM — M54.50 CHRONIC BILATERAL LOW BACK PAIN WITHOUT SCIATICA: Status: ACTIVE | Noted: 2021-07-14

## 2021-07-14 PROBLEM — R52 DIFFUSE PAIN: Status: ACTIVE | Noted: 2021-07-14

## 2021-10-08 ENCOUNTER — OFFICE VISIT (OUTPATIENT)
Dept: RHEUMATOLOGY | Facility: CLINIC | Age: 61
End: 2021-10-08
Payer: COMMERCIAL

## 2021-10-08 VITALS — WEIGHT: 273 LBS | BODY MASS INDEX: 46.61 KG/M2 | HEIGHT: 64 IN

## 2021-10-08 DIAGNOSIS — M15.9 PRIMARY OSTEOARTHRITIS INVOLVING MULTIPLE JOINTS: ICD-10-CM

## 2021-10-08 DIAGNOSIS — M54.16 LUMBAR RADICULOPATHY: ICD-10-CM

## 2021-10-08 DIAGNOSIS — M25.50 POLYARTHRALGIA: Primary | ICD-10-CM

## 2021-10-08 DIAGNOSIS — M79.7 FIBROMYALGIA: ICD-10-CM

## 2021-10-08 PROCEDURE — 99245 OFF/OP CONSLTJ NEW/EST HI 55: CPT | Performed by: INTERNAL MEDICINE

## 2021-10-08 RX ORDER — EZETIMIBE 10 MG/1
10 TABLET ORAL DAILY
COMMUNITY
Start: 2021-09-06

## 2021-10-08 RX ORDER — ASPIRIN 81 MG/1
81 TABLET ORAL DAILY
COMMUNITY

## 2021-11-04 ENCOUNTER — APPOINTMENT (EMERGENCY)
Dept: CT IMAGING | Facility: HOSPITAL | Age: 61
End: 2021-11-04
Payer: COMMERCIAL

## 2021-11-04 ENCOUNTER — HOSPITAL ENCOUNTER (EMERGENCY)
Facility: HOSPITAL | Age: 61
Discharge: HOME | End: 2021-11-04
Attending: EMERGENCY MEDICINE
Payer: COMMERCIAL

## 2021-11-04 VITALS
SYSTOLIC BLOOD PRESSURE: 161 MMHG | HEART RATE: 108 BPM | OXYGEN SATURATION: 97 % | TEMPERATURE: 98 F | DIASTOLIC BLOOD PRESSURE: 74 MMHG | RESPIRATION RATE: 18 BRPM

## 2021-11-04 DIAGNOSIS — R47.9 SPEECH ABNORMALITY: Primary | ICD-10-CM

## 2021-11-04 LAB
ALBUMIN SERPL BCP-MCNC: 3.5 G/DL (ref 3.5–5)
ALP SERPL-CCNC: 79 U/L (ref 46–116)
ALT SERPL W P-5'-P-CCNC: 31 U/L (ref 12–78)
AMPHETAMINES SERPL QL SCN: NEGATIVE
ANION GAP SERPL CALCULATED.3IONS-SCNC: 11 MMOL/L (ref 4–13)
APTT PPP: 27 SECONDS (ref 23–37)
AST SERPL W P-5'-P-CCNC: 9 U/L (ref 5–45)
ATRIAL RATE: 120 BPM
BARBITURATES UR QL: NEGATIVE
BASOPHILS # BLD AUTO: 0.02 THOUSANDS/ΜL (ref 0–0.1)
BASOPHILS NFR BLD AUTO: 0 % (ref 0–1)
BENZODIAZ UR QL: NEGATIVE
BILIRUB SERPL-MCNC: 0.3 MG/DL (ref 0.2–1)
BUN SERPL-MCNC: 15 MG/DL (ref 5–25)
CALCIUM SERPL-MCNC: 9 MG/DL (ref 8.3–10.1)
CHLORIDE SERPL-SCNC: 103 MMOL/L (ref 100–108)
CO2 SERPL-SCNC: 28 MMOL/L (ref 21–32)
COCAINE UR QL: NEGATIVE
CREAT SERPL-MCNC: 0.99 MG/DL (ref 0.6–1.3)
EOSINOPHIL # BLD AUTO: 0.36 THOUSAND/ΜL (ref 0–0.61)
EOSINOPHIL NFR BLD AUTO: 5 % (ref 0–6)
ERYTHROCYTE [DISTWIDTH] IN BLOOD BY AUTOMATED COUNT: 13.9 % (ref 11.6–15.1)
ETHANOL SERPL-MCNC: <3 MG/DL (ref 0–3)
GFR SERPL CREATININE-BSD FRML MDRD: 61 ML/MIN/1.73SQ M
GLUCOSE SERPL-MCNC: 142 MG/DL (ref 65–140)
GLUCOSE SERPL-MCNC: 159 MG/DL (ref 65–140)
HCT VFR BLD AUTO: 40.9 % (ref 34.8–46.1)
HGB BLD-MCNC: 13 G/DL (ref 11.5–15.4)
IMM GRANULOCYTES # BLD AUTO: 0.01 THOUSAND/UL (ref 0–0.2)
IMM GRANULOCYTES NFR BLD AUTO: 0 % (ref 0–2)
INR PPP: 1 (ref 0.84–1.19)
LYMPHOCYTES # BLD AUTO: 1.86 THOUSANDS/ΜL (ref 0.6–4.47)
LYMPHOCYTES NFR BLD AUTO: 24 % (ref 14–44)
MCH RBC QN AUTO: 27.7 PG (ref 26.8–34.3)
MCHC RBC AUTO-ENTMCNC: 31.8 G/DL (ref 31.4–37.4)
MCV RBC AUTO: 87 FL (ref 82–98)
METHADONE UR QL: NEGATIVE
MONOCYTES # BLD AUTO: 0.47 THOUSAND/ΜL (ref 0.17–1.22)
MONOCYTES NFR BLD AUTO: 6 % (ref 4–12)
NEUTROPHILS # BLD AUTO: 5.12 THOUSANDS/ΜL (ref 1.85–7.62)
NEUTS SEG NFR BLD AUTO: 65 % (ref 43–75)
OPIATES UR QL SCN: NEGATIVE
OXYCODONE+OXYMORPHONE UR QL SCN: NEGATIVE
P AXIS: 47 DEGREES
PCP UR QL: NEGATIVE
PLATELET # BLD AUTO: 276 THOUSANDS/UL (ref 149–390)
PMV BLD AUTO: 10 FL (ref 8.9–12.7)
POTASSIUM SERPL-SCNC: 3.5 MMOL/L (ref 3.5–5.3)
PR INTERVAL: 160 MS
PROT SERPL-MCNC: 7.5 G/DL (ref 6.4–8.2)
PROTHROMBIN TIME: 13.1 SECONDS (ref 11.6–14.5)
QRS AXIS: -47 DEGREES
QRSD INTERVAL: 86 MS
QT INTERVAL: 326 MS
QTC INTERVAL: 460 MS
RBC # BLD AUTO: 4.69 MILLION/UL (ref 3.81–5.12)
SODIUM SERPL-SCNC: 142 MMOL/L (ref 136–145)
T WAVE AXIS: 63 DEGREES
THC UR QL: POSITIVE
VENTRICULAR RATE: 120 BPM
WBC # BLD AUTO: 7.84 THOUSAND/UL (ref 4.31–10.16)

## 2021-11-04 PROCEDURE — 85610 PROTHROMBIN TIME: CPT | Performed by: EMERGENCY MEDICINE

## 2021-11-04 PROCEDURE — 80307 DRUG TEST PRSMV CHEM ANLYZR: CPT | Performed by: EMERGENCY MEDICINE

## 2021-11-04 PROCEDURE — 80053 COMPREHEN METABOLIC PANEL: CPT | Performed by: EMERGENCY MEDICINE

## 2021-11-04 PROCEDURE — 70496 CT ANGIOGRAPHY HEAD: CPT

## 2021-11-04 PROCEDURE — 70498 CT ANGIOGRAPHY NECK: CPT

## 2021-11-04 PROCEDURE — 93005 ELECTROCARDIOGRAM TRACING: CPT

## 2021-11-04 PROCEDURE — 93010 ELECTROCARDIOGRAM REPORT: CPT | Performed by: INTERNAL MEDICINE

## 2021-11-04 PROCEDURE — 82077 ASSAY SPEC XCP UR&BREATH IA: CPT | Performed by: EMERGENCY MEDICINE

## 2021-11-04 PROCEDURE — 96360 HYDRATION IV INFUSION INIT: CPT

## 2021-11-04 PROCEDURE — 99285 EMERGENCY DEPT VISIT HI MDM: CPT | Performed by: EMERGENCY MEDICINE

## 2021-11-04 PROCEDURE — 82948 REAGENT STRIP/BLOOD GLUCOSE: CPT

## 2021-11-04 PROCEDURE — G1004 CDSM NDSC: HCPCS

## 2021-11-04 PROCEDURE — 36415 COLL VENOUS BLD VENIPUNCTURE: CPT | Performed by: EMERGENCY MEDICINE

## 2021-11-04 PROCEDURE — 85025 COMPLETE CBC W/AUTO DIFF WBC: CPT | Performed by: EMERGENCY MEDICINE

## 2021-11-04 PROCEDURE — 99285 EMERGENCY DEPT VISIT HI MDM: CPT

## 2021-11-04 PROCEDURE — 85730 THROMBOPLASTIN TIME PARTIAL: CPT | Performed by: EMERGENCY MEDICINE

## 2021-11-04 RX ADMIN — SODIUM CHLORIDE 1000 ML: 0.9 INJECTION, SOLUTION INTRAVENOUS at 01:19

## 2021-11-04 RX ADMIN — IOHEXOL 90 ML: 350 INJECTION, SOLUTION INTRAVENOUS at 02:19

## 2023-03-13 ENCOUNTER — OFFICE VISIT (OUTPATIENT)
Dept: PODIATRY | Facility: CLINIC | Age: 63
End: 2023-03-13

## 2023-03-13 ENCOUNTER — APPOINTMENT (OUTPATIENT)
Dept: RADIOLOGY | Facility: CLINIC | Age: 63
End: 2023-03-13

## 2023-03-13 VITALS
WEIGHT: 273 LBS | SYSTOLIC BLOOD PRESSURE: 136 MMHG | BODY MASS INDEX: 46.61 KG/M2 | HEIGHT: 64 IN | HEART RATE: 84 BPM | DIASTOLIC BLOOD PRESSURE: 83 MMHG

## 2023-03-13 DIAGNOSIS — M19.071 PRIMARY OSTEOARTHRITIS OF RIGHT FOOT: ICD-10-CM

## 2023-03-13 DIAGNOSIS — M19.072 PRIMARY OSTEOARTHRITIS OF LEFT FOOT: Primary | ICD-10-CM

## 2023-03-13 DIAGNOSIS — L60.2 HYPERTROPHY OF NAIL: ICD-10-CM

## 2023-03-13 DIAGNOSIS — M25.572 PAIN IN JOINTS OF BOTH FEET: ICD-10-CM

## 2023-03-13 DIAGNOSIS — M25.571 PAIN IN JOINTS OF BOTH FEET: ICD-10-CM

## 2023-03-13 DIAGNOSIS — M19.072 PRIMARY OSTEOARTHRITIS OF LEFT FOOT: ICD-10-CM

## 2023-03-13 NOTE — PROGRESS NOTES
PATIENT:  Nasim Colon  1960       ASSESSMENT:     1  Primary osteoarthritis of left foot  XR foot 3+ vw left      2  Primary osteoarthritis of right foot  XR foot 3+ vw right      3  Pain in joints of both feet        4  Hypertrophy of nail  Nail removal                PLAN:  1  Reviewed medical records  Patient was counseled and educated on the condition and the diagnosis  2  X-ray was obtained and personally reviewed  The radiological findings were discussed with the patient  3  The exam and symptoms are consistent with TMTJ arthrosis  The diagnosis, treatment options and prognosis were discussed with the patient  4  Instructed supportive care, home exercise, icing, and proper footwear/ arch support  Discussed possible injection depending on the progress  5  Nails trimmed  Pt to consider palliative care  Imaging: I have personally reviewed pertinent films in PACS  Labs, pathology, and Other Studies: I have personally reviewed pertinent reports  Nail removal    Date/Time: 3/13/2023 4:06 PM  Performed by: Edmundo Loco DPM  Authorized by: Edmundo Loco DPM     Patient location:  [facility] Protocol:  Consent: Verbal consent obtained  Consent given by: patient  Timeout called at: 3/13/2023 4:06 PM   Patient understanding: patient states understanding of the procedure being performed  Patient identity confirmed: verbally with patient      Nails trimmed:     Number of nails trimmed:  10  Post-procedure details:     Patient tolerance of procedure: Tolerated well, no immediate complications          Subjective:       HPI  The patient presents with chief complaint of bilateral foot pain  She notices some aches and pain on dorsal foot, left worse than right for about 2 years  Increased pain on left foot in the last 2 months  She has occasional shooting sensation to the last 3 toes when she walks  She had X-ray of her feet several years ago  She reported she had a spur    The last time she had pain was 2 weeks ago  The patient does not recall any injury  Denied any swelling  No associated numbness or paresthesia  No significant weakness or dysfunction  No history of diabetes, but she has prediabetes  She requested nail trimming today  She cannot reach her toes  The following portions of the patient's history were reviewed and updated as appropriate: allergies, current medications, past family history, past medical history, past social history, past surgical history and problem list   All pertinent labs and images were reviewed  Past Medical History  Past Medical History:   Diagnosis Date   • Anxiety    • Arthritis    • Asthma    • Chest pain    • Depression    • Fatty liver    • GERD (gastroesophageal reflux disease)    • High cholesterol    • Hyperlipidemia    • Kidney stones    • Myofascial pain    • Psychiatric disorder    • Stroke [facility])        Past Surgical History  Past Surgical History:   Procedure Laterality Date   • TOTAL HIP ARTHROPLASTY          Allergies:  Fluticasone-salmeterol, Penicillins, and Salmeterol    Medications:  Current Outpatient Medications   Medication Sig Dispense Refill   • albuterol (PROVENTIL HFA,VENTOLIN HFA) 90 mcg/act inhaler Inhale 2 puffs every 6 (six) hours as needed for wheezing     • aspirin (ECOTRIN LOW STRENGTH) 81 mg EC tablet Take 81 mg by mouth daily     • COLLAGEN PO Take by mouth     • ezetimibe (ZETIA) 10 mg tablet Take 10 mg by mouth daily     • Homeopathic Products (ALLERGY MEDICINE PO) Take by mouth     • patient supplied medication CBD OIL     • TURMERIC PO Take by mouth       No current facility-administered medications for this visit         Social History:  Social History     Socioeconomic History   • Marital status: Single     Spouse name: None   • Number of children: None   • Years of education: None   • Highest education level: None   Occupational History   • None   Tobacco Use   • Smoking status: Former     Types: Cigarettes   • Smokeless tobacco: Never   • Tobacco comments:     teenage years   Substance and Sexual Activity   • Alcohol use: Not Currently     Comment: rarely   • Drug use: Yes     Types: Marijuana     Comment: medical marijuana    • Sexual activity: None   Other Topics Concern   • None   Social History Narrative   • None     Social Determinants of Health     Financial Resource Strain: Not on file   Food Insecurity: Not on file   Transportation Needs: Not on file   Physical Activity: Not on file   Stress: Not on file   Social Connections: Not on file   Intimate Partner Violence: Not on file   Housing Stability: Not on file          Review of Systems   Constitutional: Negative for chills and fever  Respiratory: Negative for cough and shortness of breath  Cardiovascular: Negative for chest pain  Gastrointestinal: Negative for nausea and vomiting  Musculoskeletal: Positive for arthralgias  Skin: Negative for wound  Neurological: Negative for weakness and numbness  Hematological: Negative  Psychiatric/Behavioral: Negative for behavioral problems and confusion  Objective:      /83   Pulse 84   Ht 5' 4" (1 626 m)   Wt 124 kg (273 lb)   LMP  (LMP Unknown)   BMI 46 86 kg/m²          Physical Exam  Vitals reviewed  Constitutional:       General: She is not in acute distress  Appearance: She is obese  She is not toxic-appearing  HENT:      Head: Normocephalic and atraumatic  Eyes:      Extraocular Movements: Extraocular movements intact  Cardiovascular:      Rate and Rhythm: Normal rate and regular rhythm  Pulses: Normal pulses  Dorsalis pedis pulses are 2+ on the right side and 2+ on the left side  Posterior tibial pulses are 2+ on the right side and 2+ on the left side  Pulmonary:      Effort: Pulmonary effort is normal  No respiratory distress  Musculoskeletal:         General: Tenderness and deformity present  No swelling or signs of injury  Cervical back: Normal range of motion and neck supple  Right foot: No foot drop  Left foot: No foot drop  Comments: Bunion presents left foot  Tenderness around left 3rd / 4th TMTJ  No acute swelling  No reproducible pain in right foot with palpation  Skin:     General: Skin is warm and dry  Capillary Refill: Capillary refill takes less than 2 seconds  Coloration: Skin is not cyanotic or mottled  Findings: No abscess, erythema or wound  Nails: There is no clubbing  Comments: Hypertrophy of toenails  Neurological:      General: No focal deficit present  Mental Status: She is alert and oriented to person, place, and time  Cranial Nerves: No cranial nerve deficit  Sensory: No sensory deficit  Motor: No weakness  Coordination: Coordination normal    Psychiatric:         Mood and Affect: Mood normal          Behavior: Behavior normal          Thought Content:  Thought content normal          Judgment: Judgment normal

## 2024-02-08 ENCOUNTER — HOSPITAL ENCOUNTER (OUTPATIENT)
Dept: HOSPITAL 99 - MRI 3T | Age: 64
End: 2024-02-08
Payer: MEDICARE

## 2024-02-08 DIAGNOSIS — G95.20: Primary | ICD-10-CM

## 2024-02-08 DIAGNOSIS — R20.0: ICD-10-CM
